# Patient Record
Sex: FEMALE | Race: WHITE
[De-identification: names, ages, dates, MRNs, and addresses within clinical notes are randomized per-mention and may not be internally consistent; named-entity substitution may affect disease eponyms.]

---

## 2019-08-16 ENCOUNTER — HOSPITAL ENCOUNTER (EMERGENCY)
Dept: HOSPITAL 25 - UCEAST | Age: 64
Discharge: HOME | End: 2019-08-16
Payer: COMMERCIAL

## 2019-08-16 VITALS — SYSTOLIC BLOOD PRESSURE: 101 MMHG | DIASTOLIC BLOOD PRESSURE: 67 MMHG

## 2019-08-16 DIAGNOSIS — Z88.5: ICD-10-CM

## 2019-08-16 DIAGNOSIS — Z88.0: ICD-10-CM

## 2019-08-16 DIAGNOSIS — R35.0: Primary | ICD-10-CM

## 2019-08-16 DIAGNOSIS — R31.9: ICD-10-CM

## 2019-08-16 PROCEDURE — 81002 URINALYSIS NONAUTO W/O SCOPE: CPT

## 2019-08-16 PROCEDURE — 99211 OFF/OP EST MAY X REQ PHY/QHP: CPT

## 2019-08-16 PROCEDURE — G0463 HOSPITAL OUTPT CLINIC VISIT: HCPCS

## 2019-08-16 NOTE — UC
Complaint Female HPI





- HPI Summary


HPI Summary: 


63-year-old woman comes in with a chief complaint of urinary frequency and 

urgency.  Patient reports his been going on for weeks.  Worse in the last week.

  No fevers no chills.  No flank pain.





- History Of Current Complaint


Chief Complaint: UCGU


Stated Complaint: FREQUENT URINATION


Time Seen by Provider: 08/16/19 14:56


Pain Intensity: 0





- Allergies/Home Medications


Allergies/Adverse Reactions: 


 Allergies











Allergy/AdvReac Type Severity Reaction Status Date / Time


 


aripiprazole [From Abilify] Allergy  Dizziness Verified 08/16/19 14:55


 


aspirin Allergy  Unknown Verified 08/16/19 14:55





   Reaction  





   Details  


 


bee venom protein (honey bee) Allergy  Anaphylatic Verified 08/16/19 14:55





   Shock  


 


citalopram [From Celexa] Allergy  Swelling Verified 08/16/19 14:55


 


codeine Allergy  Unknown Verified 08/16/19 14:55





   Reaction  





   Details  


 


escitalopram [From Lexapro] Allergy  See Comment Verified 08/16/19 14:55


 


formaldehyde Allergy  Rash Verified 08/16/19 14:55


 


nickel Allergy  Rash Verified 08/16/19 14:55


 


Penicillins Allergy  Airway Verified 08/16/19 14:55





   Obstruction  


 


allergy shots Allergy  Anaphylatic Uncoded 08/16/19 14:55





   Shock  


 


sleeping pills Allergy  Unknown Uncoded 08/16/19 14:55





   Reaction  





   Details  


 


steroids Allergy  Pain Uncoded 08/16/19 14:55











Home Medications: 


 Home Medications





NK [No Home Medications Reported]  08/16/19 [History Confirmed 08/16/19]











PMH/Surg Hx/FS Hx/Imm Hx


Previously Healthy: Yes





- Surgical History


Surgical History: Yes


Surgery Procedure, Year, and Place: eye surgery x2 for lazy eye.  T&A





- Family History


Known Family History: Positive: Non-Contributory





- Social History


Alcohol Use: None


Substance Use Type: None


Smoking Status (MU): Never Smoked Tobacco





Review of Systems


All Other Systems Reviewed And Are Negative: Yes


Constitutional: Positive: Negative


Skin: Positive: Negative


Eyes: Positive: Negative


ENT: Positive: Negative


Respiratory: Positive: Negative


Cardiovascular: Positive: Negative


Gastrointestinal: Positive: Negative


Genitourinary: Positive: Frequency, Urgency


Motor: Positive: Negative


Neurovascular: Positive: Negative


Musculoskeletal: Positive: Negative


Neurological: Positive: Negative


Psychological: Positive: Negative


Is Patient Immunocompromised?: No





Physical Exam


Triage Information Reviewed: Yes


Appearance: Well-Appearing, No Pain Distress, Well-Nourished


Vital Signs: 


 Initial Vital Signs











Temp  99.3 F   08/16/19 14:39


 


Pulse  77   08/16/19 14:39


 


Resp  16   08/16/19 14:39


 


BP  101/67   08/16/19 14:39


 


Pulse Ox  97   08/16/19 14:39











Vital Signs Reviewed: Yes


Eye Exam: Normal


Eyes: Positive: Conjunctiva Clear


Neck: Positive: Supple


Respiratory: Positive: Lungs clear, Normal breath sounds, No respiratory 

distress


Cardiovascular: Positive: RRR


Abdomen Description: Positive: Nontender, Soft.  Negative: CVA Tenderness (R), 

CVA Tenderness (L)


Musculoskeletal: Positive: Strength Intact, ROM Intact


Neurological: Positive: Alert


Psychological: Positive: Age Appropriate Behavior


Skin Exam: Normal





 Complaint Female Dx





- Course


Course Of Treatment: 


No evidence of UTI at this time.  There is no glucose in the urine.





- Differential Dx/Diagnosis


Provider Diagnosis: 


 Increased urinary frequency, Hematuria








Discharge





- Sign-Out/Discharge


Documenting (check all that apply): Patient Departure


All imaging exams completed and their final reports reviewed: No Studies





- Discharge Plan


Condition: Stable


Disposition: HOME


Patient Education Materials:  Hematuria (ED), Urinary Urgency and Frequency (DC)


Referrals: 


Lobito Fernandez MD [Medical Doctor] - 


Jakub Horta MD [Medical Doctor] - 


Additional Instructions: 


FOLLOW UP WITH UROLOGY.


GET RECHECKED SOONER IF YOUR CONDITION WORSENS OR ANY QUESTIONS OR CONCERNS.





- Billing Disposition and Condition


Condition: STABLE


Disposition: Home